# Patient Record
Sex: FEMALE | Race: WHITE | NOT HISPANIC OR LATINO | URBAN - METROPOLITAN AREA
[De-identification: names, ages, dates, MRNs, and addresses within clinical notes are randomized per-mention and may not be internally consistent; named-entity substitution may affect disease eponyms.]

---

## 2020-06-29 ENCOUNTER — OFFICE VISIT (OUTPATIENT)
Dept: URBAN - METROPOLITAN AREA SURGERY CENTER 4 | Facility: SURGERY CENTER | Age: 65
End: 2020-06-29

## 2020-07-02 LAB — PATHOLOGY (INDENTED REPORT): (no result)

## 2020-07-13 ENCOUNTER — OFFICE VISIT (OUTPATIENT)
Dept: URBAN - METROPOLITAN AREA CLINIC 63 | Facility: CLINIC | Age: 65
End: 2020-07-13

## 2020-07-16 ENCOUNTER — OFFICE VISIT (OUTPATIENT)
Dept: URBAN - METROPOLITAN AREA TELEHEALTH 2 | Facility: TELEHEALTH | Age: 65
End: 2020-07-16

## 2022-07-09 ENCOUNTER — TELEPHONE ENCOUNTER (OUTPATIENT)
Dept: URBAN - METROPOLITAN AREA CLINIC 121 | Facility: CLINIC | Age: 67
End: 2022-07-09

## 2022-07-10 ENCOUNTER — TELEPHONE ENCOUNTER (OUTPATIENT)
Dept: URBAN - METROPOLITAN AREA CLINIC 121 | Facility: CLINIC | Age: 67
End: 2022-07-10

## 2022-07-10 RX ORDER — EZETIMIBE 10 MG/1
TABLET ORAL
Refills: 0 | Status: ACTIVE | COMMUNITY
Start: 2020-05-28

## 2022-07-10 RX ORDER — IBUPROFEN 200 MG
CAPSULE ORAL
Refills: 0 | Status: ACTIVE | COMMUNITY
Start: 2020-05-28

## 2022-07-10 RX ORDER — LEVOTHYROXINE SODIUM 25 UG/1
TABLET ORAL
Refills: 0 | Status: ACTIVE | COMMUNITY
Start: 2020-05-28

## 2022-07-10 RX ORDER — DENOSUMAB 60 MG/ML
INJECTION SUBCUTANEOUS
Refills: 0 | Status: ACTIVE | COMMUNITY
Start: 2020-05-28

## 2022-07-10 RX ORDER — FINASTERIDE 5 MG/1
TABLET, FILM COATED ORAL
Refills: 0 | Status: ACTIVE | COMMUNITY
Start: 2020-05-28

## 2024-10-10 ENCOUNTER — OFFICE VISIT (OUTPATIENT)
Dept: URBAN - METROPOLITAN AREA CLINIC 63 | Facility: CLINIC | Age: 69
End: 2024-10-10

## 2024-10-18 ENCOUNTER — TELEPHONE ENCOUNTER (OUTPATIENT)
Dept: URBAN - METROPOLITAN AREA CLINIC 64 | Facility: CLINIC | Age: 69
End: 2024-10-18

## 2024-11-18 PROBLEM — 305058001: Status: ACTIVE | Noted: 2024-11-18

## 2024-11-18 PROBLEM — 59614000: Status: ACTIVE | Noted: 2024-11-18

## 2024-11-18 PROBLEM — 235595009: Status: ACTIVE | Noted: 2024-11-18

## 2024-11-18 PROBLEM — 77880009: Status: ACTIVE | Noted: 2024-11-18

## 2024-11-19 ENCOUNTER — LAB OUTSIDE AN ENCOUNTER (OUTPATIENT)
Dept: URBAN - METROPOLITAN AREA CLINIC 60 | Facility: CLINIC | Age: 69
End: 2024-11-19

## 2024-11-19 ENCOUNTER — OFFICE VISIT (OUTPATIENT)
Dept: URBAN - METROPOLITAN AREA CLINIC 60 | Facility: CLINIC | Age: 69
End: 2024-11-19
Payer: MEDICARE

## 2024-11-19 ENCOUNTER — DASHBOARD ENCOUNTERS (OUTPATIENT)
Age: 69
End: 2024-11-19

## 2024-11-19 VITALS
DIASTOLIC BLOOD PRESSURE: 68 MMHG | HEART RATE: 98 BPM | SYSTOLIC BLOOD PRESSURE: 118 MMHG | BODY MASS INDEX: 24.53 KG/M2 | RESPIRATION RATE: 12 BRPM | WEIGHT: 147.2 LBS | OXYGEN SATURATION: 97 % | TEMPERATURE: 98.6 F | HEIGHT: 65 IN

## 2024-11-19 DIAGNOSIS — K21.9 GASTROESOPHAGEAL REFLUX DISEASE, UNSPECIFIED WHETHER ESOPHAGITIS PRESENT: ICD-10-CM

## 2024-11-19 DIAGNOSIS — Z12.11 SCREEN FOR COLON CANCER: ICD-10-CM

## 2024-11-19 DIAGNOSIS — K62.89 RECTAL PAIN: ICD-10-CM

## 2024-11-19 PROCEDURE — 99203 OFFICE O/P NEW LOW 30 MIN: CPT | Performed by: INTERNAL MEDICINE

## 2024-11-19 RX ORDER — DENOSUMAB 60 MG/ML
INJECTION SUBCUTANEOUS
Refills: 0 | Status: ACTIVE | COMMUNITY
Start: 2020-05-28

## 2024-11-19 RX ORDER — POLYETHYLENE GLYCOL 3350 17 G/17G
1 SCOOP MIXED WITH 8 OUNCES OF FLUID POWDER, FOR SOLUTION ORAL ONCE A DAY
Status: ACTIVE | COMMUNITY

## 2024-11-19 RX ORDER — FINASTERIDE 5 MG/1
TABLET, FILM COATED ORAL
Refills: 0 | Status: ACTIVE | COMMUNITY
Start: 2020-05-28

## 2024-11-19 RX ORDER — LEVOTHYROXINE SODIUM 25 UG/1
TABLET ORAL
Refills: 0 | Status: ACTIVE | COMMUNITY
Start: 2020-05-28

## 2024-11-19 RX ORDER — IBUPROFEN 200 MG
CAPSULE ORAL
Refills: 0 | Status: ACTIVE | COMMUNITY
Start: 2020-05-28

## 2024-11-19 RX ORDER — EZETIMIBE 10 MG/1
TABLET ORAL
Refills: 0 | Status: ACTIVE | COMMUNITY
Start: 2020-05-28

## 2024-11-19 NOTE — HPI-PREVIOUS PROCEDURES
Upper endoscopy June 2020 Dr. Vergara: Normal esophagus, biopsies were obtained from the proximal and distal esophagus to rule out EOE-pathology noted negative for intestinal metaplasia and negative for EOE, 2 cm hiatal hernia, mild inflammation in the in the stomach characterized by congestion and erythema-gastric body biopsies negative for H. pylori, normal duodenum. 2 cm hiatal hernia  Colonoscopy Dr. Vergara June 2020: Somewhat difficult colonoscopy due to significant looping and a tortuous colon. Successful completion was aided by performing maneuvers documented below. Quality of the prep was adequate. Findings include grade 3 hemorrhoids, normal terminal ileum, patchy area of moderately erythematous eroded inflamed mucosa in the rectum and in the sigmoid colon biopsies were taken-pathology unremarkable. Grade 3 hemorrhoids-recall colonoscopy 2025  Colonoscopy 2015:Large polyp

## 2024-11-19 NOTE — HPI-TODAY'S VISIT:
Estrella is here today to schedule her colonoscopy. She was last seen by my nurse practitioner Timothy Taylor back in 2020. Previously had history of chronic reflux. Currently she is asymptomatic. She is off acid suppression. She denies any symptoms or dysphagia. She is on a regular bowel regimen with MiraLAX on a daily basis to keep her regular. She denies any rectal bleeding. Reports some anorectal discomfort. She has a history of grade 3 hemorrhoids and hemorrhoid banding. She is now feeling better with using Preparation H. She declined a rectal examination during this visit and wishes to have her hemorrhoids looked at during her colonoscopy. She is otherwise doing well from a GI standpoint. Good appetite. No unintentional weight loss. No rectal bleeding. No abdominal pain. She has a routine visit visit with her cardiologist and is getting an echocardiogram as a routine follow-up. She is asymptomatic from a cardiac standpoint.   She is a pleasant 69-year-old female with history of colon polyps who underwent her surveillance colonoscopy back in 2020. Significant history of osteoporosis on Prolia and is hesitant to be on a PPI. Diagnosed with COVID-19-in July 2020.

## 2025-04-24 ENCOUNTER — OFFICE VISIT (OUTPATIENT)
Dept: URBAN - METROPOLITAN AREA SURGERY CENTER 4 | Facility: SURGERY CENTER | Age: 70
End: 2025-04-24

## 2025-05-23 ENCOUNTER — OFFICE VISIT (OUTPATIENT)
Dept: URBAN - METROPOLITAN AREA CLINIC 63 | Facility: CLINIC | Age: 70
End: 2025-05-23

## 2025-07-08 ENCOUNTER — TELEPHONE ENCOUNTER (OUTPATIENT)
Dept: URBAN - METROPOLITAN AREA CLINIC 60 | Facility: CLINIC | Age: 70
End: 2025-07-08